# Patient Record
Sex: MALE | Employment: FULL TIME | URBAN - METROPOLITAN AREA
[De-identification: names, ages, dates, MRNs, and addresses within clinical notes are randomized per-mention and may not be internally consistent; named-entity substitution may affect disease eponyms.]

---

## 2019-03-20 ENCOUNTER — HOSPITAL ENCOUNTER (OUTPATIENT)
Age: 49
Discharge: HOME | End: 2019-03-20
Payer: COMMERCIAL

## 2019-03-20 VITALS
DIASTOLIC BLOOD PRESSURE: 85 MMHG | WEIGHT: 215 LBS | RESPIRATION RATE: 20 BRPM | TEMPERATURE: 99 F | BODY MASS INDEX: 27.59 KG/M2 | SYSTOLIC BLOOD PRESSURE: 130 MMHG | OXYGEN SATURATION: 96 % | HEART RATE: 93 BPM | HEIGHT: 74 IN

## 2019-03-20 DIAGNOSIS — J01.40 ACUTE NON-RECURRENT PANSINUSITIS: Primary | ICD-10-CM

## 2019-03-20 PROCEDURE — 99204 OFFICE O/P NEW MOD 45 MIN: CPT

## 2019-03-20 PROCEDURE — 69209 REMOVE IMPACTED EAR WAX UNI: CPT

## 2019-03-20 PROCEDURE — 99203 OFFICE O/P NEW LOW 30 MIN: CPT

## 2019-03-20 RX ORDER — AZITHROMYCIN 250 MG/1
TABLET, FILM COATED ORAL
Qty: 1 PACKAGE | Refills: 0 | Status: SHIPPED | OUTPATIENT
Start: 2019-03-20 | End: 2019-03-25

## 2019-03-20 NOTE — ED PROVIDER NOTES
Patient presents with:  Cough/URI      HPI:     Jessica Varghese is a 53year old male with no significant past medical history presents with sinus pressure, headache, earache and tactile fevers over the last 7 days.  Pt reports a history of sinusitis nontoxic appearance, afebrile. Post ear irrigation there is no erythema noted to the ear canal.  TM appears normal.  Patient is requesting azithromycin for sinusitis at this time. States he has taken that medication in the past and it has worked.   Also e

## 2024-04-16 ENCOUNTER — APPOINTMENT (OUTPATIENT)
Dept: GENERAL RADIOLOGY | Age: 54
End: 2024-04-16
Attending: NURSE PRACTITIONER

## 2024-04-16 ENCOUNTER — HOSPITAL ENCOUNTER (OUTPATIENT)
Age: 54
Discharge: HOME | End: 2024-04-16

## 2024-04-16 VITALS
SYSTOLIC BLOOD PRESSURE: 149 MMHG | HEART RATE: 68 BPM | OXYGEN SATURATION: 96 % | DIASTOLIC BLOOD PRESSURE: 92 MMHG | RESPIRATION RATE: 16 BRPM | TEMPERATURE: 98 F

## 2024-04-16 DIAGNOSIS — R20.8 SENSATION OF FOREIGN BODY IN FINGER: Primary | ICD-10-CM

## 2024-04-16 DIAGNOSIS — L03.011 CELLULITIS OF FINGER OF RIGHT HAND: ICD-10-CM

## 2024-04-16 PROCEDURE — 99203 OFFICE O/P NEW LOW 30 MIN: CPT

## 2024-04-16 PROCEDURE — 73140 X-RAY EXAM OF FINGER(S): CPT | Performed by: NURSE PRACTITIONER

## 2024-04-16 PROCEDURE — 99204 OFFICE O/P NEW MOD 45 MIN: CPT

## 2024-04-16 RX ORDER — CEPHALEXIN 500 MG/1
500 CAPSULE ORAL 4 TIMES DAILY
Qty: 40 CAPSULE | Refills: 0 | Status: SHIPPED | OUTPATIENT
Start: 2024-04-16 | End: 2024-04-26

## 2024-04-16 NOTE — ED PROVIDER NOTES
Patient Seen in: [facility]      History     Chief Complaint   Patient presents with    FB in Skin     Stated Complaint: splinter    Subjective:   HPI    53-year-old male states 2 weeks ago was working outside, wood chipping, when a piece of wood went through his work glove into the right third finger at the proximal joint dorsally.  Initially noticed some erythema and swelling erythematous since subsided.  Still with swelling and sensation of foreign body.  He is right-hand dominant.  Tetanus is up to date per patient.     Objective:   History reviewed. No pertinent past medical history.           History reviewed. No pertinent surgical history.             Social History     Socioeconomic History    Marital status: Unknown   Tobacco Use    Smoking status: Never    Smokeless tobacco: Never   Social History Narrative    ** Merged History Encounter **          Social Determinants of Health      Received from [facility]              Review of Systems    Positive for stated complaint: splinter  Other systems are as noted in HPI.  Constitutional and vital signs reviewed.      All other systems reviewed and negative except as noted above.    Physical Exam     ED Triage Vitals [04/16/24 1049]   BP (!) 149/92   Pulse 68   Resp 16   Temp 98.2 °F (36.8 °C)   Temp src    SpO2 96 %   O2 Device        Current:BP (!) 149/92   Pulse 68   Temp 98.2 °F (36.8 °C)   Resp 16   SpO2 96%         Physical Exam  Vitals and nursing note reviewed.   HENT:      Head: Normocephalic.      Right Ear: Tympanic membrane normal.      Left Ear: Tympanic membrane normal.      Nose: Nose normal.      Mouth/Throat:      Mouth: Mucous membranes are moist.   Eyes:      Pupils: Pupils are equal, round, and reactive to light.   Cardiovascular:      Rate and Rhythm: Normal rate and regular rhythm.   Pulmonary:      Effort: Pulmonary effort is normal. No respiratory distress.      Breath sounds: No wheezing.   Abdominal:       General: There is no distension.      Tenderness: There is no abdominal tenderness.   Musculoskeletal:         General: Normal range of motion.      Right hand: Tenderness present.      Left hand: No tenderness.        Hands:       Cervical back: Normal range of motion.      Comments: Medial, ulnar, radial nerves are intact.   Skin:     General: Skin is warm and dry.   Neurological:      Mental Status: He is alert.               ED Course   Labs Reviewed - No data to display       ED Course as of 04/16/24 1201  ------------------------------------------------------------  Time: 04/16 1143  Value: XR FINGER(S) (MIN 2 VIEWS), RIGHT 3RD (CPT=73140)  Comment: CONCLUSION: Unremarkable middle finger radiographs              MDM                                       Medical Decision Making  53-year-old male presents with foreign body suspected in right third PIP joint.  Not able to visualize at bedside nor able to palpate.  Patient notes tenderness at site.  No wound noted.  Sent to x-ray will evaluate.  Anticipate hand follow-up and discharge.    Xray of R 3rd finger>I have directly viewed this exam, no FB appreciated per my read. Pending rad read.     Xray>No FB appreciated.     Sent message to Dr. Stewart requesting appt, told to fu in his clinic at anytime. If infection reoccurs or worsens may need to go to ER for care.     Physical exam remained stable over serial reexaminations as previously documented.      I have discussed with the patient the results of tests, differential diagnosis, and warning signs and symptoms that should prompt immediate return.  The patient understands these instructions and agrees to the follow-up plan provided.  There are no barriers to learning.   Appropriate f/u given.  Patient agrees to return for any concerns/problems/complications.                Disposition and Plan     Clinical Impression:  1. Sensation of foreign body in finger    2. Cellulitis of finger of right hand          Disposition:  Discharge  4/16/2024 11:45 am    Follow-up:  Florentino Breen MD  [address] [facility] [id number] [phone]    Call   Hand surgeon    Landon Qiu MD  [address] [facility] [address]  [phone]      Primary care          Medications Prescribed:  Discharge Medication List as of 4/16/2024 11:51 AM        START taking these medications    Details   cephalexin 500 MG Oral Cap Take 1 capsule (500 mg total) by mouth 4 (four) times daily for 10 days., Normal, Disp-40 capsule, R-0

## 2024-04-16 NOTE — ED INITIAL ASSESSMENT (HPI)
Patient arrived ambulatory to room c/o a splinter to the 3rd digit on the right hand. Patient states he was wood chipping. States he still feels the fb in his finger. No drainage. No redness. No fevers.

## 2024-04-16 NOTE — DISCHARGE INSTRUCTIONS
Dr. aVlentin will see you in clinic call number listed in follow up.       REFER TO HANDOUT FOR FURTHER DISCHARGE CARE.  MAY TAKE OVER THE COUNTER MEDICATIONS SUCH AS TYLENOL (ACETAMINOPHEN)  OR MOTRIN (IBUPROFEN) FOR PAIN.    -Please call your doctor or return if you notice signs of infection including:  a)Redness  b)Swelling  c)Foul odor  d)Discharge  e)Fever  f)Increased Pain  g)If the wound becomes warm to the touch

## 2024-08-31 ENCOUNTER — HOSPITAL ENCOUNTER (OUTPATIENT)
Age: 54
Discharge: HOME | End: 2024-08-31

## 2024-08-31 ENCOUNTER — APPOINTMENT (OUTPATIENT)
Dept: GENERAL RADIOLOGY | Age: 54
End: 2024-08-31
Attending: PHYSICIAN ASSISTANT

## 2024-08-31 VITALS
RESPIRATION RATE: 18 BRPM | TEMPERATURE: 98 F | OXYGEN SATURATION: 97 % | HEART RATE: 76 BPM | DIASTOLIC BLOOD PRESSURE: 82 MMHG | SYSTOLIC BLOOD PRESSURE: 129 MMHG

## 2024-08-31 DIAGNOSIS — M72.2 PLANTAR FASCIITIS: Primary | ICD-10-CM

## 2024-08-31 DIAGNOSIS — H00.012 HORDEOLUM EXTERNUM OF RIGHT LOWER EYELID: ICD-10-CM

## 2024-08-31 PROCEDURE — 73630 X-RAY EXAM OF FOOT: CPT | Performed by: PHYSICIAN ASSISTANT

## 2024-08-31 PROCEDURE — 99214 OFFICE O/P EST MOD 30 MIN: CPT

## 2024-08-31 PROCEDURE — 99213 OFFICE O/P EST LOW 20 MIN: CPT

## 2024-08-31 RX ORDER — ERYTHROMYCIN 5 MG/G
1 OINTMENT OPHTHALMIC EVERY 6 HOURS
Qty: 1 G | Refills: 0 | Status: SHIPPED | OUTPATIENT
Start: 2024-08-31 | End: 2024-09-07

## 2024-08-31 NOTE — ED PROVIDER NOTES
Patient Seen in: [facility]      History     Chief Complaint   Patient presents with    Foot Pain     Stated Complaint: Leg Pain & Stye    Subjective:   HPI    Patient is a 53-year-old male that presents to immediate care due to right foot pain.  Denies acute injury or fall.  States he is an  and is frequently on his feet.  Patient also notes he developed redness pain of his right lower eyelid.  Denies wearing contact lenses.  Denies eye pain pressure or blurred vision.    Objective:   History reviewed. No pertinent past medical history.           History reviewed. No pertinent surgical history.             Social History     Socioeconomic History    Marital status: Unknown   Tobacco Use    Smoking status: Never    Smokeless tobacco: Never   Social History Narrative    ** Merged History Encounter **          Social Determinants of Health      Received from [facility]              Review of Systems    Positive for stated Chief Complaint: Foot Pain    Other systems are as noted in HPI.  Constitutional and vital signs reviewed.      All other systems reviewed and negative except as noted above.    Physical Exam     ED Triage Vitals [08/31/24 0941]   /82   Pulse 76   Resp 18   Temp 97.5 °F (36.4 °C)   Temp src Temporal   SpO2 97 %   O2 Device None (Room air)       Current Vitals:   Vital Signs  BP: 129/82  Pulse: 76  Resp: 18  Temp: 97.5 °F (36.4 °C)  Temp src: Temporal    Oxygen Therapy  SpO2: 97 %  O2 Device: None (Room air)            Physical Exam    Vital signs reviewed. Nursing note reviewed.  Constitutional: Well-developed. Well-nourished. In no acute distress  HENT: Mucous membranes moist.   EYES: No scleral icterus or conjunctival injection. Perrl EOMI hordeolum of right lower eyelid  NECK: Full ROM. Supple.   CARDIAC: Normal rate. Normal S1/ S2. 2+ distal pulses. No edema  PULM/CHEST: Clear to auscultation bilaterally. No wheezes  Extremities: Full  ROM  NEURO: Awake, alert, following commands, moving extremities, answering questions.   SKIN: Warm and dry. No rash or lesions.  PSYCH: Normal judgment. Normal affect.               MDM      Patient is a 53-year-old male who presents to immediate care due to right foot pain x 2 days.  Patient arrives with stable vitals.  Physical exam showing tenderness palpation of the plantar fascia insertion point on the bottom of the right foot.  X-ray was performed, personally reviewed by me showing calcifications along the plantar fascia.  Most likely plantar fasciitis.  Less likely acute fracture or dislocation.  Additionally physical exam showing hordeolum of right lower eyelid.  Most likely origin.  Less likely corneal abrasion, blepharitis, preseptal cellulitis.  Will try with warm compresses erythromycin.  Patient Afsaneh that he has podiatry follow-up in 1 week.  Continue rest, NSAID use.  History given by patient.                                   Medical Decision Making      Disposition and Plan     Clinical Impression:  1. Plantar fasciitis    2. Hordeolum externum of right lower eyelid         Disposition:  Discharge  8/31/2024 10:38 am    Follow-up:  No follow-up provider specified.        Medications Prescribed:  Discharge Medication List as of 8/31/2024 10:38 AM        START taking these medications    Details   erythromycin 5 MG/GM Ophthalmic Ointment Apply 1 Application to eye every 6 (six) hours for 7 days., Normal, Disp-1 g, R-0

## 2024-08-31 NOTE — ED INITIAL ASSESSMENT (HPI)
Patient arrives ambulatory with c/o pain in right heel and arch, foot swelling that has been progressively worsening since Tuesday. Denies injury/trauma. Also reports he believes he is getting a stye in his right eye.